# Patient Record
Sex: MALE | Race: BLACK OR AFRICAN AMERICAN | Employment: FULL TIME | URBAN - METROPOLITAN AREA
[De-identification: names, ages, dates, MRNs, and addresses within clinical notes are randomized per-mention and may not be internally consistent; named-entity substitution may affect disease eponyms.]

---

## 2019-06-16 ENCOUNTER — HOSPITAL ENCOUNTER (EMERGENCY)
Age: 34
Discharge: HOME | End: 2019-06-16
Attending: EMERGENCY MEDICINE
Payer: COMMERCIAL

## 2019-06-16 VITALS
SYSTOLIC BLOOD PRESSURE: 134 MMHG | HEART RATE: 62 BPM | BODY MASS INDEX: 27.5 KG/M2 | WEIGHT: 203 LBS | RESPIRATION RATE: 18 BRPM | HEIGHT: 72 IN | OXYGEN SATURATION: 99 % | TEMPERATURE: 96.6 F | DIASTOLIC BLOOD PRESSURE: 86 MMHG

## 2019-06-16 DIAGNOSIS — M70.41 PREPATELLAR BURSITIS OF RIGHT KNEE: Primary | ICD-10-CM

## 2019-06-16 PROCEDURE — 99282 EMERGENCY DEPT VISIT SF MDM: CPT

## 2019-06-16 NOTE — ED PROVIDER NOTES
[address] [facility]  EMERGENCY DEPARTMENTENCOUNTER      Pt Name: Shira Childs  MRN: [id number]  [place] 1985  Date ofevaluation: 6/16/2019  Provider: Christopher Nunez, [address] [place] [address]       Chief Complaint   Patient presents with    Joint Swelling     pt in from home with swelling in right knee         HISTORY OF PRESENT ILLNESS   (Location/Symptom, Timing/Onset,Context/Setting, Quality, Duration, Modifying Factors, Severity)  Note limiting factors. HPI    Shira Childs is a 29 y.o. male who presents to the emergency department with a chief complaint of swelling of the right knee for the last 2 weeks time. 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 No swelling with the right knee he is try to switch to the left knee swelling has continued. Denies any pain no fever no warmth. No weakness or numbness        Nursing Notes were reviewed. REVIEW OF SYSTEMS    (2-9 systems for level 4, 10 or more for level 5)     Review of Systems  General:Denies fever, headache  ENT: no runny nose, sore throat   Respiratory:  no cough, chest congestion or shortness of breath  Cardiovascular: no chest pain or palpitations  Musculoskelatal: no neck pain or Back pain   Neurological:  No weakness, numbness no speech or memory problems. Remainder of systems reviewed and negative. Nursing notes reviewed and I agree, except as otherwise noted. vitals signs reviewed. Allergies, Past Medical and Surgical History reviewed. Social andFamily History reviewed. and I agree, except as otherwise noted       Except as noted above the remainder of the review of systems was reviewed and negative. PAST MEDICAL HISTORY     Past Medical History:   Diagnosis Date    Asthma          SURGICAL HISTORY     History reviewed. No pertinent surgical history.       CURRENT MEDICATIONS       Previous Medications    No medications on file       ALLERGIES     Patient has no known allergies. FAMILY HISTORY     History reviewed. No pertinent family history. SOCIAL HISTORY       Social History     Socioeconomic History    Marital status:      Spouse name: None    Number of children: None    Years of education: None    Highest education level: None   Occupational History    None   Social Needs    Financial resource strain: None    Food insecurity:     Worry: None     Inability: None    Transportation needs:     Medical: None     Non-medical: None   Tobacco Use    Smoking status: Never Smoker    Smokeless tobacco: Never Used   Substance and Sexual Activity    Alcohol use: No    Drug use: No    Sexual activity: None   Lifestyle    Physical activity:     Days per week: None     Minutes per session: None    Stress: None   Relationships    Social connections:     Talks on phone: None     Gets together: None     Attends Mandaeism service: None     Active member of club or organization: None     Attends meetings of clubs or organizations: None     Relationship status: None    Intimate partner violence:     Fear of current or ex partner: None     Emotionally abused: None     Physically abused: None     Forced sexual activity: None   Other Topics Concern    None   Social History Narrative    None       SCREENINGS             PHYSICAL EXAM    (up to 7 for level 4, 8 or more for level 5)     ED Triage Vitals [06/16/19 0019]   BP Temp Temp src Pulse Resp SpO2 Height Weight   134/86 96.6 °F (35.9 °C) -- 62 18 99 % 6' (1.829 m) 203 lb (92.1 kg)       Physical Exam  GENERAL: Patient appeared well-developed, well-nourished, vital signs reviewed. MENTAL STATUS: Patient is awake and alert   HEAD AND FACE :Head is normal cephalic. Face normal appearance  EYES: eyes lids and conjunctiva appear normal. Pupils reactive  ENT :ears and nose appear normal externally.    CV: Heart regular rate and rhythm without murmur,  LUNGS: Lungs are clear to auscultation  CHEST WALL: normal appearance. normal motion  EXTREMITIES: Extremities moves all 4 Extremities without any weakness. The right knee shows no knee joint effusion he does have a prepatellar bursa swelling present. There is no associated redness no warmth to palpation. Has a good range of motion of the knee without pain no ligamentous laxity circulation sensations intact extremity  There is no calf tenderness or lower extremity edema  SPSYCHIATRIC:mood and affect normal    NEUROLOGIC: no weakness or numbness noted,  no meningeal signs      This is a computerized dictation. I have made every effort to proofread this chart, however, transcription errors may exist.     DIAGNOSTIC RESULTS       Interpretation per the Radiologist below, if available at the time of this note:    No orders to display           LABS:  Labs Reviewed - No data to display    All other labs were within normal range or not returned as of this dictation. EMERGENCY DEPARTMENT COURSE and DIFFERENTIAL DIAGNOSIS/MDM:   Vitals:    Vitals:    06/16/19 0019   BP: 134/86   Pulse: 62   Resp: 18   Temp: 96.6 °F (35.9 °C)   SpO2: 99%   Weight: 203 lb (92.1 kg)   Height: 6' (1.829 m)           MDM    Patient has a prepatellar bursitis likely from his job jumping on tires with his knees. Placement Ace wrap have him follow-up with orthopedics. Commend he wears kneepads for work . Return if warmth redness temperature or new symptoms    REASSESSMENT        CONSULTS:  None    Procedures:  Unless otherwise noted below, none     Procedures    FINAL IMPRESSION        1.  Prepatellar bursitis of right knee          DISPOSITION/PLAN   DISPOSITION Decision To Discharge 06/16/2019 12:37:52 AM      PATIENT REFERRED TO:  Justice Mclaughlin MD  [address] [place] [address]  [phone]    In 1 week        DISCHARGE MEDICATIONS:  New Prescriptions    No medications on file          (Please note that portions of this note were completed with a voice recognition program.  Efforts were made to edit thedictations but occasionally words are mis-transcribed.)    Jessica Kay DO (electronically signed)Emergency Physician       Jessica Kay DO  06/16/19 5642

## 2019-06-21 ENCOUNTER — OFFICE VISIT (OUTPATIENT)
Dept: ORTHOPEDIC SURGERY | Age: 34
End: 2019-06-21
Payer: COMMERCIAL

## 2019-06-21 VITALS
DIASTOLIC BLOOD PRESSURE: 76 MMHG | HEIGHT: 72 IN | SYSTOLIC BLOOD PRESSURE: 134 MMHG | WEIGHT: 202 LBS | BODY MASS INDEX: 27.36 KG/M2 | HEART RATE: 71 BPM

## 2019-06-21 DIAGNOSIS — M70.41 PREPATELLAR BURSITIS OF RIGHT KNEE: Primary | ICD-10-CM

## 2019-06-21 PROCEDURE — 99203 OFFICE O/P NEW LOW 30 MIN: CPT | Performed by: PHYSICIAN ASSISTANT

## 2019-06-21 PROCEDURE — 20610 DRAIN/INJ JOINT/BURSA W/O US: CPT | Performed by: PHYSICIAN ASSISTANT

## 2019-06-21 NOTE — PROGRESS NOTES
or induration. Neurological: The patient has good coordination and balance. There is no focal weakness or sensory deficit. Right Knee Examination:    Inspection: Normal muscle contours and no significant limb length discrepancy. No gross atrophy in any particular myotome. There is a large prepatellar bursitis. There are no abrasions, lacerations, contusions, hematomas or ecchymosis. There is no erythema, induration or warmth to suggest an infectious process. Palpation: Patient has minimal tenderness on palpation of the anterior knee. Range of Motion:    Flexion: 130°   Extension: 0°    Strength:  Quad 5 / 5  ; Hamstrings 5 / 5. Gross motor to hip and ankle intact    Special Tests:    Lachman (ACL) - negative   Posterior Lachman (PCL) - negative    Anterior Drawer (ACL) - negative   Posterior Drawer (PCL) - negative   Pivot shift (ACL) - negative    Posterior sag (PCL) - negative   Magnolia's Test (Meniscus) - negative   Homans Test (Thrombophlebitis)- negative   Does not open to valgus or varus stress at 0 or 30° (MCL/LCL)    Skin: There are no rashes, ulcerations or lesions. Sensation to light touch intact. Circulation: The limb is warm and well perfused. Distal pulses intact. Capillary refill is intact. Edema: none. Venous stasis changes: none. Gait: Patient with normal tandem gait without limp. Normal strides                   Radiology:  X-rays obtained today and reviewed in office:  Views: 3 view right knee including AP, lateral and sunrise  Impression: No evidence for acute fracture or subluxation / dislocation. There are no loose bodies appreciated. There is no evidence of tibiofemoral subluxation. There is a large prepatellar effusion. Impression:  Encounter Diagnosis   Name Primary?     Prepatellar bursitis of right knee Yes       Office Procedures:  Orders Placed This Encounter   Procedures    XR KNEE RIGHT (3 VIEWS)     Standing Status:   Future     Number of Occurrences:   1 Standing Expiration Date:   7/21/2019    AK ARTHROCENTESIS ASPIR&/INJ MAJOR JT/BURSA W/O US       Aspiration and Injection:  After discussing the risks and benefits of aspiration including increased pain, infection, bleeding, lack of improvement and neurovascular injury he agreed to proceed today. Questions were encouraged and answered. The correct patient, procedure, site and side were identified. The right knee was prepped with Betadine and 5 mL 1% lidocaine plain (50mg) were instilled into the prepatellar bursa under aseptic technique. Using an 18-gauge needle 30 mL blood tinged fluid was aspirated. Using the same needle 1 mL of betamethasone (6mg) was injected into the prepatellar bursa. The skin was then cleaned again with alcohol and a sterile adhesive dressing was applied. He tolerated this well and was instructed regarding post-injection care of icing and decreased activity as necessary. 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 Please see note above. There is no sign of cellulitis or infection. Felicitas Velazco was aspirated and injected with cortisone as recorded above in the procedure note. An Ace wrap was applied which he will wear for the next several days. He is advised not to do any kneeling at work for at least 1 month. He will plan on following up in 2 weeks if swelling returns. Krysta Geremias was informed of the results of any imaging. We discussed treatment options and a time was given to answer questions. A plan was proposed and Luis Carlosbritni Archuleta understand and accepts this course of care. Electronically signed by Noman Person PA-C on 7/06/2443  Board Certified [facility]    Please note that portions of this note were completed with a voice recognition program.  Efforts were made to edit the dictations but occasionally words are mis-transcribed.

## 2019-07-11 ENCOUNTER — OFFICE VISIT (OUTPATIENT)
Dept: ORTHOPEDIC SURGERY | Age: 34
End: 2019-07-11
Payer: COMMERCIAL

## 2019-07-11 VITALS — HEIGHT: 72 IN | WEIGHT: 202 LBS | BODY MASS INDEX: 27.36 KG/M2

## 2019-07-11 DIAGNOSIS — M70.41 PREPATELLAR BURSITIS OF RIGHT KNEE: ICD-10-CM

## 2019-07-11 DIAGNOSIS — M70.41 PREPATELLAR BURSITIS OF RIGHT KNEE: Primary | ICD-10-CM

## 2019-07-11 LAB
APPEARANCE FLUID: NORMAL
CELL COUNT FLUID TYPE: NORMAL
CLOT EVALUATION: NORMAL
COLOR FLUID: NORMAL
EOSINOPHIL FLUID: 4 %
LYMPHOCYTES, BODY FLUID: 40 %
MACROPHAGE FLUID: 13 %
MONOCYTE, FLUID: 21 %
NEUTROPHIL, FLUID: 22 %
NUCLEATED CELLS FLUID: 850 /CUMM
NUMBER OF CELLS COUNTED FLUID: 100
RBC FLUID: NORMAL /CUMM
VOLUME: 3 ML

## 2019-07-11 PROCEDURE — 20610 DRAIN/INJ JOINT/BURSA W/O US: CPT | Performed by: PHYSICIAN ASSISTANT

## 2019-07-11 PROCEDURE — 99213 OFFICE O/P EST LOW 20 MIN: CPT | Performed by: PHYSICIAN ASSISTANT

## 2019-07-11 RX ORDER — BETAMETHASONE SODIUM PHOSPHATE AND BETAMETHASONE ACETATE 3; 3 MG/ML; MG/ML
12 INJECTION, SUSPENSION INTRA-ARTICULAR; INTRALESIONAL; INTRAMUSCULAR; SOFT TISSUE ONCE
Status: COMPLETED | OUTPATIENT
Start: 2019-07-11 | End: 2019-07-11

## 2019-07-11 RX ADMIN — BETAMETHASONE SODIUM PHOSPHATE AND BETAMETHASONE ACETATE 12 MG: 3; 3 INJECTION, SUSPENSION INTRA-ARTICULAR; INTRALESIONAL; INTRAMUSCULAR; SOFT TISSUE at 11:17

## 2019-07-11 NOTE — PROGRESS NOTES
MAJOR JT/BURSA W/O US    betamethasone acetate-betamethasone sodium phosphate (CELESTONE) injection 12 mg       Aspiration and Injection:   After discussing the risks and benefits of aspiration including increased pain, infection, bleeding, lack of improvement and neurovascular injury he agreed to proceed today. Questions were encouraged and answered. The correct patient, procedure, site and side were identified. The right knee was prepped with Betadine and 3 mL 1% lidocaine plain (30mg) were instilled into the prepatellar bursa under aseptic technique. Using an 18-gauge needle 30 mL blood tinged fluid was aspirated. Using the same needle 2 mL of betamethasone (12mg) was injected into the prepatellar bursa. The skin was then cleaned again with alcohol and a sterile adhesive dressing was applied. He tolerated this well and was instructed regarding post-injection care of icing and decreased activity as necessary. Treatment Plan:    Patient presented with a recurrence of his prepatellar bursitis. There is still no erythema, induration, warmth or drainage to suggest infectious process. He denies having any pain. Fluid was aspirated at today's visit. Fluid will be sent for culture and cell count. He was given a cortisone injection into the bursa. Ace wrap is applied. 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 If this continues recurring he would likely need to speak with Dr. Deya Walker about a bursectomy. Sam Camacho was informed of the results of any imaging. We discussed treatment options and a time was given to answer questions. A plan was proposed and Sam Camacho understand and accepts this course of care.           Electronically signed by Saima Martin PA-C on 2/35/1642  Board Certified [facility]    Please note that portions of this note were completed with a voice recognition program.  Efforts were made to edit the

## 2019-07-29 LAB
BODY FLUID CULTURE, STERILE: NORMAL
GRAM STAIN RESULT: NORMAL

## 2019-08-26 ENCOUNTER — APPOINTMENT (OUTPATIENT)
Dept: GENERAL RADIOLOGY | Age: 34
End: 2019-08-26
Payer: COMMERCIAL

## 2019-08-26 ENCOUNTER — HOSPITAL ENCOUNTER (EMERGENCY)
Age: 34
Discharge: HOME | End: 2019-08-26
Payer: COMMERCIAL

## 2019-08-26 VITALS
RESPIRATION RATE: 17 BRPM | SYSTOLIC BLOOD PRESSURE: 146 MMHG | HEART RATE: 65 BPM | OXYGEN SATURATION: 100 % | TEMPERATURE: 97.9 F | DIASTOLIC BLOOD PRESSURE: 88 MMHG

## 2019-08-26 DIAGNOSIS — S61.210A LACERATION OF RIGHT INDEX FINGER WITHOUT FOREIGN BODY WITHOUT DAMAGE TO NAIL, INITIAL ENCOUNTER: ICD-10-CM

## 2019-08-26 DIAGNOSIS — S61.212A LACERATION OF RIGHT MIDDLE FINGER WITHOUT FOREIGN BODY WITHOUT DAMAGE TO NAIL, INITIAL ENCOUNTER: Primary | ICD-10-CM

## 2019-08-26 PROCEDURE — 73130 X-RAY EXAM OF HAND: CPT

## 2019-08-26 PROCEDURE — 99282 EMERGENCY DEPT VISIT SF MDM: CPT

## 2019-08-26 PROCEDURE — 4500000023 HC ED LEVEL 3 PROCEDURE

## 2019-08-26 NOTE — ED PROVIDER NOTES
Conjunctivae are normal. Right eye exhibits no discharge. Left eye exhibits no discharge. No scleral icterus. Neck: Normal range of motion. Neck supple. Cardiovascular: Normal rate, regular rhythm and normal heart sounds. Pulmonary/Chest: Effort normal and breath sounds normal.   Musculoskeletal: Normal range of motion. He exhibits tenderness. Patient has 2 lacerations one each on the second and third PIP joint dorsally. Vertically in position. Each measure slightly greater than 1 cm. Total linear length 2 cm. Patient has excellent strength against resistance. I have low suspicion for extensor tendon transection. He has intact sensation distally. He does exhibit brisk nailbed refill. Neurological: He is alert and oriented to person, place, and time. Skin: Skin is warm and dry. Psychiatric: He has a normal mood and affect. His behavior is normal. Judgment and thought content normal.   Nursing note and vitals reviewed. MEDICAL DECISION MAKING    Vitals:    Vitals:    08/26/19 1922   BP: (!) 146/88   Pulse: 65   Resp: 17   Temp: 97.9 °F (36.6 °C)   TempSrc: Infrared   SpO2: 100%       LABS:Labs Reviewed - No data to display     Remainder of labs reviewed and werenegative at this time or not returned at the time of this note. RADIOLOGY:   Non-plain film images such as CT, Ultrasound and MRI are read by the radiologist. Yoselin Cesar PA-C have directly visualized the radiologic plain film image(s) with the below findings:    X-ray obtained right hand shows no fracture or dislocation. No radiopaque foreign body. Interpretation per the Radiologist below, if available at the time of thisnote:    XR HAND RIGHT (MIN 3 VIEWS)   Preliminary Result   1. No evidence of acute fracture. Follow-up examination recommended in 7-10   days if clinically indicated.    2. No evidence of radiopaque foreign body with reservation regarding   radiopaque material beneath fingernails of thumb and middle

## 2019-10-16 ENCOUNTER — OFFICE VISIT (OUTPATIENT)
Dept: ORTHOPEDIC SURGERY | Age: 34
End: 2019-10-16
Payer: COMMERCIAL

## 2019-10-16 VITALS
BODY MASS INDEX: 27.17 KG/M2 | WEIGHT: 200.6 LBS | HEART RATE: 62 BPM | SYSTOLIC BLOOD PRESSURE: 129 MMHG | DIASTOLIC BLOOD PRESSURE: 89 MMHG | HEIGHT: 72 IN

## 2019-10-16 DIAGNOSIS — M70.41 PREPATELLAR BURSITIS OF RIGHT KNEE: Primary | ICD-10-CM

## 2019-10-16 PROCEDURE — 20610 DRAIN/INJ JOINT/BURSA W/O US: CPT | Performed by: PHYSICIAN ASSISTANT

## 2019-10-16 PROCEDURE — 99213 OFFICE O/P EST LOW 20 MIN: CPT | Performed by: PHYSICIAN ASSISTANT

## 2019-10-16 RX ORDER — BETAMETHASONE SODIUM PHOSPHATE AND BETAMETHASONE ACETATE 3; 3 MG/ML; MG/ML
6 INJECTION, SUSPENSION INTRA-ARTICULAR; INTRALESIONAL; INTRAMUSCULAR; SOFT TISSUE ONCE
Status: SHIPPED | OUTPATIENT
Start: 2019-10-16